# Patient Record
(demographics unavailable — no encounter records)

---

## 2024-11-12 NOTE — PAST MEDICAL HISTORY
[Menarche Age ____] : age at menarche was [unfilled] [Menopause Age____] : age at menopause was [unfilled] [History of Hormone Replacement Treatment] : has a history of hormone replacement treatment [Total Preg ___] : G[unfilled] [Live Births ___] : P[unfilled]  [Age At Live Birth ___] : Age at live birth: [unfilled] [FreeTextEntry5] : no [FreeTextEntry6] : no [FreeTextEntry7] : yes (past) [FreeTextEntry8] : no

## 2024-11-12 NOTE — PHYSICAL EXAM
[Normocephalic] : normocephalic [EOMI] : extra ocular movement intact [Supple] : supple [No Supraclavicular Adenopathy] : no supraclavicular adenopathy [No Cervical Adenopathy] : no cervical adenopathy [de-identified] : Bilateral breast/axilla/supraclavicular area: No masses, discharge, or adenopathy\par

## 2024-11-12 NOTE — HISTORY OF PRESENT ILLNESS
[FreeTextEntry1] : Patient is a 80 yo F who presents today for breast cancer screening. Denies family history of breast or ovarian cancer. Patient denies palpable masses, skin changes, or nipple discharge bilaterally.  6/28/17: B/l MG- BOGDAN 10/3/18: B/l MG- heterogeneously dense 10/15/19: B/l MG- no evidence of malignancy 10/21/20: B/l MG- BOGDAN 10/27/21: B/L MG & US- right 10:00 6cmfn 0.8cm hypoechoic mass with benign morphology BI RADS 3 4/6/22: R US- 0.9cm 10:00 6FN solid nodule w/ benign features, stable. BIRADS 2.  10/31/22: B/L MG & US- 0.6cm 10:00 6FN hypoechoic nodule, smaller in size. BIRADS 2. 11/9/23: B/l MG & US- heterogeneously dense. R stable 0.7cm mass 10:00 6FN. BI-RADS 2 11/20/24: B/l MG/US: scheduled

## 2024-11-20 NOTE — HISTORY OF PRESENT ILLNESS
[FreeTextEntry1] : Patient is a 78 yo F who presents today for breast cancer screening. Denies family history of breast or ovarian cancer. Patient denies palpable masses, skin changes, or nipple discharge bilaterally.  6/28/17: B/l MG- BOGDAN 10/3/18: B/l MG- heterogeneously dense 10/15/19: B/l MG- no evidence of malignancy 10/21/20: B/l MG- BOGDAN 10/27/21: B/L MG & US- right 10:00 6cmfn 0.8cm hypoechoic mass with benign morphology BI RADS 3 4/6/22: R US- 0.9cm 10:00 6FN solid nodule w/ benign features, stable. BIRADS 2.  10/31/22: B/L MG & US- 0.6cm 10:00 6FN hypoechoic nodule, smaller in size. BIRADS 2. 11/9/23: B/l MG & US- heterogeneously dense. R stable 0.7cm mass 10:00 6FN. BI-RADS 2 11/20/24: B/l MG/US: Heterogeneously dense.  US-R stable 0.8 cm 10:00 6 FN mass.  BOGDAN.  BI-RADS 2.

## 2024-11-20 NOTE — PHYSICAL EXAM
[de-identified] : Bilateral breast/axilla/supraclavicular area: No masses, discharge, or adenopathy\par

## 2024-11-20 NOTE — PHYSICAL EXAM
[de-identified] : Bilateral breast/axilla/supraclavicular area: No masses, discharge, or adenopathy\par

## 2024-11-20 NOTE — HISTORY OF PRESENT ILLNESS
[FreeTextEntry1] : Patient is a 80 yo F who presents today for breast cancer screening. Denies family history of breast or ovarian cancer. Patient denies palpable masses, skin changes, or nipple discharge bilaterally.  6/28/17: B/l MG- BOGDAN 10/3/18: B/l MG- heterogeneously dense 10/15/19: B/l MG- no evidence of malignancy 10/21/20: B/l MG- BOGDAN 10/27/21: B/L MG & US- right 10:00 6cmfn 0.8cm hypoechoic mass with benign morphology BI RADS 3 4/6/22: R US- 0.9cm 10:00 6FN solid nodule w/ benign features, stable. BIRADS 2.  10/31/22: B/L MG & US- 0.6cm 10:00 6FN hypoechoic nodule, smaller in size. BIRADS 2. 11/9/23: B/l MG & US- heterogeneously dense. R stable 0.7cm mass 10:00 6FN. BI-RADS 2 11/20/24: B/l MG/US: Heterogeneously dense.  US-R stable 0.8 cm 10:00 6 FN mass.  BOGDAN.  BI-RADS 2.